# Patient Record
Sex: FEMALE | Race: WHITE
[De-identification: names, ages, dates, MRNs, and addresses within clinical notes are randomized per-mention and may not be internally consistent; named-entity substitution may affect disease eponyms.]

---

## 2018-04-27 NOTE — RAD
THREE VIEWS LUMBAR SPINE:

 

HISTORY: 

Radiculopathy.  Pain.

 

COMPARISON: 

None.

 

FINDINGS: 

There are 5 lumbar-type vertebral bodies.  Vertebral body height is maintained.  No fracture.  There 
is grade I retrolisthesis throughout the lumbar spine at L1-L2, L2-L3, and L3-L4.  There is moderate 
to severe loss of disk space height at L2-L3 and L3-L4.  Upon flexion, the degree of retrolisthesis d
oes not change.  Upon extension, the degree of retrolisthesis has not changed.

 

IMPRESSION: 

Persistent retrolisthesis as above.

 

POS: EPIFANIO

## 2018-04-27 NOTE — MRI
MRI LUMBAR SPINE WITHOUT IV CONTRAST:

 

Date:  04/27/18 

 

HISTORY:  

Lumbar radiculopathy. Patient states low back pain with pain radiating down base of neck to bilateral
 shoulders, as well as down into both hips and legs since June 2017. 

 

FINDINGS:

A 1.3 cm increased T2-weighted signal intensity lesion seen in the mid portion of right kidney, which
 likely represents cyst but is difficult to characterize on this exam. Nonemergent renal sonogram wou
ld be helpful for further evaluation. Retroperitoneal structures otherwise demonstrate a normal MRI a
ppearance. 

 

End plate degenerative changes are seen at the L3-4 level. Schmorl's node seen at the inferior end pl
ate of the L2 vertebral body. Normal signal intensity otherwise demonstrated in the bone marrow. 

 

T11-12 Level: 

There is a focus of increased T2-weighted signal intensity seen in the paracentral posterior margin o
f the intervertebral disc consistent with a small annular tear. There is minimal associated disc bulg
e with slight effacement of the ventral subarachnoid space. Neural foramina are patent. 

 

T12-L1 Level:

There is no disc bulge or disc herniation. There are mild facet degenerative changes. There is no sig
nificant narrowing of the central spinal canal and the neural foramina are widely patent. 

 

L1-2 Level:

There is loss of intervertebral disc height. There is trace retrolisthesis of L1 and L2. There is a b
road based disc osteophyte complex, but there is no significant narrowing of the central spinal canal
, and the neural foramina are patent. 

 

L2-3 Level:

There is trace retrolisthesis of L2 on L3. There is severe loss of intervertebral disc space height. 
There is a broad based disc osteophyte complex. This flattens the ventral aspect of the thecal sac wi
thout significant narrowing of the central spinal canal. There is mild encroachment on each neural fo
ramen.  

 

L3-4 Level:

There is trace retrolisthesis of L3 on L4. There is severe loss of intervertebral disc height. There 
is a broad based disc osteophyte complex present. This again results in slight flattening of the vent
ral aspect of the thecal sac without significant narrowing of the central spinal canal. There is mild
 to moderate left-sided neural foraminal narrowing with mild right-sided neural foraminal narrowing. 


 

L4-5 Level: 

There is minimal disc bulge without significant narrowing of the central spinal canal. Facet hypertro
phic changes are present at this level. There is mild to moderate right and mild left-sided neural fo
raminal narrowing. 

 

L5-S1 Level:

There is a minimal broad based disc bulge, but central spinal canal is patent. There are prominent fa
cet and hypertrophic changes bilaterally with mild bilateral neural foraminal narrowing. 

 

The conus medullaris is normal in appearance and terminates at the L1-2 level. 

 

IMPRESSION: 

1.  Multilevel degenerative changes with mild degrees of neural foraminal narrowing. 

 

2.  Trace retrolisthesis of L1 on L2, L2 on L3, and L3 on L4. 

 

3.  Annular tear involving the T11-12 intervertebral disc. 

 

4.  Cystic lesion right kidney, statistically likely representing a small renal cyst. However, renal 
sonogram would be helpful for further evaluation. 

 

 

 

POS: [facility]

## 2019-07-22 NOTE — ULT
LEFT LOWER EXTREMITY VENOUS DUPLEX ULTRASOUND INCLUDING COLOR AND SPECTRAL DOPPLER IMAGING:

 

HISTORY:

Lower leg edema and swelling.

 

TECHNIQUE:

Exam performed from groin to ankle, including the visualized greater saphenous, the common femoral, t
he superficial femoral, the profunda femoral, the popliteal, and trifurcation, and the posterior tibi
al vein regions.  There is phasic flow at all levels with normal compressibility and normal augmentat
ion.  No intraluminal thrombus.

 

FINDINGS:

In the medial mid calf region, there is a small fluid collection, measuring approximately 0.9 x 2.3 x
 9.9 cm, etiology is uncertain, conceivably this could represent a small hematoma or seroma.  Another
 consideration would be that of fluid, in association with a ruptured Baker's cyst or popliteal fossa
 cyst.  From ultrasound findings alone, the possibility of this representing a small infected fluid c
ollection cannot be totally excluded.  Please correlate clinically.  If additional imaging is felt to
 be needed clinically, a follow-up MRI with and without contrast might be of benefit.

 

IMPRESSION:

1.  No evidence for deep venous thrombosis.

 

2.  Elongated fluid collection in the soft tissues of the medial mid calf.

 

POS: RRE

## 2020-03-02 NOTE — MMO
Bilateral MAMMO Bilat Screen [facility].

 

CLINICAL HISTORY:

Patient is 69 years old and is seen for screening. The patient has no family

history of breast cancer.  The patient has no personal history of cancer.

 

VIEWS:

The views performed were:  bilateral craniocaudal with tomosynthesis and

bilateral mediolateral oblique with tomosynthesis.

 

FILMS COMPARED:

The present examination has been compared to prior imaging studies performed at

[facility] on 09/22/2015 and 12/12/2016.

 

This study has been interpreted with the assistance of computer-aided detection.

 

MAMMOGRAM FINDINGS:

There are scattered fibroglandular densities.

 

Benign calcifications are noted bilaterally.

 

There are no suspicious masses, suspicious calcifications, or new areas of

architectural distortion.

 

IMPRESSION:

THERE IS NO MAMMOGRAPHIC EVIDENCE OF MALIGNANCY.

 

A ROUTINE FOLLOW-UP MAMMOGRAM IN 1 YEAR IS RECOMMENDED.

 

THE RESULTS OF THIS EXAM WERE SENT TO THE PATIENT.

 

ACR BI-RADS Category 2 - Benign finding

 

MAMMOGRAPHY NOTE:

 1. A negative mammogram report should not delay a biopsy if a dominant of

 clinically suspicious mass is present.

 2. Approximately 10% to 15% of breast cancers are not detected by

 mammography.

 3. Adenosis and dense breasts may obscure an underlying neoplasm.

 

 

Reported by: JAYY ARAGON MD

Electonically Signed: [id number]

## 2020-06-16 NOTE — RAD
EXAM: 2 views of the right knee



HISTORY: Knee arthroplasty



COMPARISON: None



FINDINGS: No knee effusion is seen. The patient is status post knee arthroplasty without perihardware
 lucency or fracture. Air in the soft tissues is from recent surgery.



IMPRESSION: Status post knee arthroplasty without evidence of complication.



Reported By: Ravi Gonzales 

Electronically Signed:  6/16/2020 9:48 AM

## 2020-06-16 NOTE — OP
DATE OF PROCEDURE:  06/16/2020



This is Zacarias Pino PA-C dictating a report for Francisco Cannon MD.



PREOPERATIVE DIAGNOSIS:  End-stage tricompartmental osteoarthritis, right knee, with

degenerative genu valgum. 



POSTOPERATIVE DIAGNOSIS:  End-stage tricompartmental osteoarthritis, right knee,

with degenerative genu valgum. 



PROCEDURE PERFORMED:  Cemented cruciate-sparing computer-assisted navigated right

total knee arthroplasty. 



ASSISTANT:  Zacarias Pino PA-C



ANESTHESIA:  General via LMA, augmented with indwelling adductor canal and a

single-shot sciatic block. 



COMPONENTS USED:  [email or web address] Triathlon primary cemented cruciate-sparing

size 3 femoral component, primary cemented size 3 tibial base plate, 9-mm

polyethylene fixed bearing insert, and an S27 patellar button. 



TOURNIQUET TIME:  54 minutes at 300 mmHg.



FINDINGS:  End-stage severe degenerative tricompartmental disease, bone-on-bone

arthrosis, periarticular osteophyte formation, large serous effusion, hypertrophic

synovium, and changes consistent with degenerative genu valgum and a significant

wear pattern of the lateral compartment. 



DRAINS:  None.



SPECIMENS:  None.



COMPLICATIONS:  None.



ESTIMATED BLOOD LOSS:  Less than 100.



INDICATIONS FOR SURGERY:  Susanne is a 69-year-old female who has had progressive

right knee pain and problems with standing and walking for the last 5 to 7 years.

She has failed conservative management, elected to proceed with total knee

arthroplasty as definitive treatment of her pain. 



PROCEDURE IN DETAIL:  After informed consent was obtained in the preoperative

holding area, the patient was taken to the operative suite where general anesthesia

was induced.  Once adequate level of general anesthesia was obtained, the patient

was positioned and a well-padded tourniquet was placed around the right proximal

thigh.  The right lower extremity was then prepped and draped in the usual sterile

fashion.  Prior to exsanguination, a time-out was called and all members of the

surgical team agreed upon site, surgeon, and patient.  The extremity was then

exsanguinated and the tourniquet was raised.  A midline longitudinal incision was

then made directly over the patella extending 2 fingerbreadths above the superior

pole of the patella and 2 fingerbreadths inferior to the inferior patellar pole of

the patella.  Deeper subcutaneous layers were dissected sharply and local bleeding

was controlled with Bovie electrocautery.  A quad tendon longitudinal split was then

made sharply and a median parapatellar arthrotomy was carried out both sharp and

with Bovie electrocautery, carried down to 1 fingerbreadth medial to the tibial

tubercle.  The knee was then placed into flexion and the patella was everted nicely,

and a copious fat pad ectomy was performed, allowing for greater exposure of the

tibia.  The computer-assisted distal femoral fiducial was then placed and pinned

firmly, and the distal femoral cutting guide was pinned firmly into place.  The

oscillating saw was then used to remove the appropriate amount of bone.  The 4-in-1

cutting block was then placed on the distal femur and the oscillating saw was used

to remove the appropriate amount of bone off the anterior, posterior, and chamfer

cuts.  After completion of bone cuts, the anterior cruciate ligament was resected

sharply and the posterior cruciate ligament retractor was placed and the tibia was

subluxed for better exposure.  Partial meniscectomies were carried out, and the

tibial computer-assisted fiducial was pinned, and the cutting guide was placed.

Oscillating saw was then used to remove the bone, with Hohmann retractors used to

take care and protect the collateral ligaments.  After the tibial resection was

performed, a laminar  was placed in between the freshened bone cuts.  The

knee placed at 90 degrees and further bilateral meniscectomies were carried out, and

the curved osteotome and curettage were used to remove any excess bone spurs in the

posterior compartment.  The trial femoral component, tibial baseplate were placed

with the appropriate polyethylene trial insert with an appropriate polyethylene

spacer and patellar button.  The knee was taken through full range of motion with

flexion and extension from 0 to 90 degrees and patellar broach squarely in the

trochlea without any squinting or subluxation noted.  The knee was also stable to

varus and valgus stressing at 0, 15, 45, and 90 degrees of flexion.  The drawer was

negative.  All trial components were then removed and the keel punch was used to

provide the appropriate defect in the tibia with a mallet.  The freshened bone cuts

were copiously irrigated with pulsatile lavage of about 1.5 L to remove all excess

debris.  The freshened bone cuts were then dried with suction and lap sponge.  The

knee was placed in flexion and retractors were placed to provide access to all bone

cuts.  Tobramycin-impregnated methyl methacrylate cement was then placed on the

freshened bone cuts and implants which were malleted firmly into place.  Curettage

and [place] elevators were used to remove any excess bone cement.  The knee was placed

into full extension and the patellar button was placed under compression, and the

cement was allowed to cure.  Once completed, the components were again taken through

full range of motion and copious irrigation of the knee was carried out with another

liter of normal saline.  All components were inspected fully with full range of

motion and varus and valgus stressing. There was no laxity noted and full extension

was observed clinically.  Primary closure was accomplished with #2 interrupted

Vicryl stitch of the arthrotomy defect.  This was oversewn with a #2 running Quill

barbed stitch.  The subcutaneous layer was then closed with a running 0 barbed

Monocryl stitch and skin closure accomplished with a running subcuticular 3-0

Monocryl barbed Quill stitch and augmented with cement on the skin.  Tourniquet was

lowered.  Good spontaneous return of distal pulses was noted clinically and a

sterile dressing was applied to the incision.  The procedure was terminated without

any complications.  The patient was awakened in the operative suite and taken to the

recovery room in stable condition. 







Job ID:  [id number]

## 2020-06-17 NOTE — PDOC.HOSPP
- Subjective


Encounter Date: 06/17/20


Subjective: 


69 years old female with no significant past medical history apart from 

arthritis and anxiety. She is status post TKA POD1.


She is participating in physical therapy and her pain is controlled.


BP is on the lower side.





- Objective


Vital Signs & Weight: 


 Vital Signs (12 hours)











  Temp Pulse Resp BP Pulse Ox


 


 06/17/20 11:08  97.5 F L  91  18  80/53 L  96


 


 06/17/20 07:21  98.0 F  74  18  95/62  100


 


 06/17/20 05:34  98 F   16  101/66  93 L








 Weight











Admit Weight                   171 lb


 


Weight                         171 lb














I&O: 


 











 06/16/20 06/17/20 06/18/20





 06:59 06:59 06:59


 


Intake Total  874 


 


Output Total  3000 


 


Balance  -2126 











Result Diagrams: 


 06/17/20 05:06








Hospitalist ROS





- Medication


Medications: 


Active Medications











Generic Name Dose Route Start Last Admin





  Trade Name Freq  PRN Reason Stop Dose Admin


 


Hydrocodone Bitart/Acetaminophen  1 tab  06/16/20 07:24  06/17/20 08:52





  [place] 10/325  PO   1 tab





  Q4H PRN   Administration





  Pain (1-3)   





     





     





     


 


Alprazolam  1 mg  06/16/20 15:00  06/17/20 08:51





  Xanax  PO   1 mg





  TID MINNIE   Administration





     





     





     





     


 


Aspirin  81 mg  06/16/20 09:00  06/17/20 08:50





  Ecotrin  PO   81 mg





  BID MINNIE   Administration





     





     





     





     


 


Enoxaparin Sodium  40 mg  06/17/20 09:00  06/17/20 08:52





  Lovenox  SC   40 mg





  0900 MINNIE   Administration





     





     





     





     


 


Ferrous Gluconate  324 mg  06/17/20 08:00  06/17/20 08:50





  Fergon  PO   324 mg





  BID- MINNIE   Administration





     





     





     





     


 


Sodium Chloride  1,000 mls @ 100 mls/hr  06/16/20 07:00  06/17/20 13:00





  Normal Saline 0.9%  IV   Not Given





  .Q10H MINNIE   





     





     





     





     


 


Ropivacaine 250 ml/ Device  250 mls @ 10 mls/hr  06/16/20 07:24  06/17/20 09:49





  NERVE BLCK  06/19/20 07:23  250 mls





  INF MINNIE   Administration





     





     





     





     


 


Iron/Minerals/Multivitamins  1 tab  06/17/20 09:00  06/17/20 08:51





  Theragran M  PO   1 tab





  DAILY MINNIE   Administration





     





     





     





     


 


Ketorolac Tromethamine  15 mg  06/16/20 16:00  06/17/20 09:49





  Toradol  IVP  06/18/20 10:01  15 mg





  0400,1000,1600,2200 MINNIE   Administration





     





     





     





     


 


Pantoprazole Sodium  40 mg  06/17/20 09:00  06/17/20 08:50





  Protonix  PO   40 mg





  DAILY MINNIE   Administration





     





     





     





     


 


Quetiapine Fumarate  100 mg  06/16/20 21:00  06/16/20 21:12





  Seroquel  PO   100 mg





  HS MINNIE   Administration





     





     





     





     


 


Senna/Docusate Sodium  2 tab  06/17/20 09:00  06/17/20 08:50





  Senokot S  PO   2 tab





  BID MINNIE   Administration





     





     





     





     














- Exam


General Appearance: awake alert


ENT: normocephalic atraumatic


Neck: supple


Respiratory: normal chest expansion, no tachypnea


Gastrointestinal: soft


Neurological: cranial nerve grossly intact





Hosp A/P


(1) Hypotension


Status: Acute   





(2) Anxiety


Code(s): F41.9 - ANXIETY DISORDER, UNSPECIFIED   Status: Acute   





(3) Osteoarthritis


Code(s): M19.90 - UNSPECIFIED OSTEOARTHRITIS, UNSPECIFIED SITE   Status: Acute 

  





- Plan


Bolus 500cc of NS.


Enoxaparin for DVTppx.


Protonix for ulcer ppx since the patient is receiving NSAID.


PT & OT.

## 2020-10-15 NOTE — ULT
EXAM:

Right lower extremity venous Doppler US



HISTORY:

Right lower extremity edema and pain



FINDINGS:

Grayscale, color-flow, Doppler evaluation, spectral analysis of the right lower extremity venous stru
ctures is performed with 2-D imaging. The right common femoral, superficial femoral, popliteal,

posterior tibial, proximal greater saphenous and profunda femoral veins are imaged.



There is normal luminal compressibility, flow, and augmentation the visualized deep venous structures
 of the right lower extremity.



IMPRESSION:

No evidence of a deep vein thrombosis in the right lower extremity.



Reported By: Jaycob Cao 

Electronically Signed:  10/15/2020 11:57 AM

## 2020-11-17 NOTE — RAD
LUMBAR SPINE 4 VIEWS:

 

Date:  11/17/2020

 

INDICATION:

History of intervertebral disc disorder with lumbar radiculopathy. 

 

COMPARISON:  

MRI of the lumbar spine performed on 11/17/2020. 

 

FINDINGS:

As seen on the comparison lumbar spine MRI is retrolisthesis of L3 on L4 and L2 on L3 with mild Grade
 I anterolisthesis of L5 on S1. There is also very minimal retrolisthesis of L1 on L2. No definite ab
normal translational motion is seen on the flexion/extension radiographs. SI joints are normal appear
ing. 

 

IMPRESSION: 

Spondylosis of the lumbar spine without abnormal translation motion. 

 

 

POS: BH

## 2020-11-17 NOTE — MRI
MRI LUMBAR SPINE WITHOUT CONTRAST:

 

Date:  11/17/2020

 

INDICATION:

70-year-old female with intervertebral disc disorder and radiculopathy. 

 

COMPARISON:  

Prior exam dated 04/27/2018. 

 

FINDINGS:

There is persistent retrolisthesis that is mild in severity affecting L3-4 and L2-3. There is advance
d disc degenerative disease at L2-3 and L3-4 which is similar. No acute fracture is evident. The conu
s is seen to terminate at L1. Visualized aspects of the retroperitoneum appear within normal limits. 


 

At L5-S1, there is moderate facet joint degenerative change and a mild broad based disc bulge. There 
is very subtle Grade I anterolisthesis that is stable. There is mild bilateral neural foraminal narro
wing which is stable. 

 

At L4-5, there is a broad based bulge with moderate left and mild right facet joint degenerative poon
ge that appears stable in size. There is mild bilateral neural foraminal narrowing that is stable. 

 

At L3-4, there is a disc osteophyte complex with facet hypertrophy and loss of disc space height domenic
cing mild bilateral neural foraminal narrowing, left greater than right, that is stable. 

 

At L2-3, there is a broad based asymmetric to the right disc osteophyte complex inducing mild right n
eural foraminal narrowing which is stable. 

 

At L1-2, there is a broad based bulge inducing some mild effacement of the subarachnoid space without
 nerve root compression. There is mild bilateral neural foraminal narrowing which is stable. 

 

At T12-L1, there is no appreciable central canal or neural foraminal narrowing. 

 

IMPRESSION: 

Stable moderate spondylosis of the lumbar spine with multilevel neural foraminal narrowing. 

 

 

POS: LJ

## 2020-12-18 NOTE — ULT
Left lower extremity venous Doppler ultrasound:

12/18/2020



COMPARISON: None



HISTORY: Left knee replacement, posterior left knee pain



TECHNIQUE: Multiplanar grayscale sonographic imaging of the venous structures of the left lower extre
mity obtained with color flow and spectral analysis



FINDINGS: Left common femoral vein, greater saphenous vein, profunda femoral vein, femoral vein, popl
iteal vein, and posterior tibial vein are patent. Normal blood flow, augmentation, and compression

within the deep venous system on the left. No evidence for deep venous thrombosis.



There is a large lobulated fluid collection in the posterior medial aspect of the popliteal fossa wit
h internal debris suggesting a complex popliteal fossa cyst, measuring at least 4.8 x 2.9 x 3.2 cm.



IMPRESSION: No evidence for deep venous thrombosis of the left lower extremity. Findings suggesting a
 large complex left popliteal fossa cyst.



Reported By: Mo Montoya 

Electronically Signed:  12/18/2020 12:55 PM

## 2021-03-02 ENCOUNTER — HOSPITAL ENCOUNTER (OUTPATIENT)
Dept: HOSPITAL 92 - BICMAMMO | Age: 71
Discharge: HOME | End: 2021-03-02
Attending: FAMILY MEDICINE
Payer: MEDICARE

## 2021-03-02 DIAGNOSIS — Z12.31: Primary | ICD-10-CM

## 2021-03-02 PROCEDURE — 77067 SCR MAMMO BI INCL CAD: CPT

## 2021-03-02 PROCEDURE — 77063 BREAST TOMOSYNTHESIS BI: CPT

## 2021-03-02 NOTE — MMO
Bilateral MAMMO Bilat Screen [facility].

 

CLINICAL HISTORY:

Patient is 70 years old and is seen for screening. The patient has no family

history of breast cancer.  The patient has no personal history of cancer.

 

VIEWS:

The views performed were:  bilateral craniocaudal with tomosynthesis and

bilateral mediolateral oblique with tomosynthesis.

 

FILMS COMPARED:

The present examination has been compared to prior imaging studies performed at

[facility] on 02/21/2020, and at [facility] on 09/22/2015 and 12/12/2016.

 

This study has been interpreted with the assistance of computer-aided detection.

 

MAMMOGRAM FINDINGS:

There are scattered fibroglandular densities.

 

There are stable benign appearing calcifications seen in both breasts.

 

There are no suspicious masses, suspicious calcifications, or new areas of

architectural distortion.

 

IMPRESSION:

THERE IS NO MAMMOGRAPHIC EVIDENCE OF MALIGNANCY.

 

A ROUTINE FOLLOW-UP MAMMOGRAM IN 1 YEAR IS RECOMMENDED.

 

THE RESULTS OF THIS EXAM WERE SENT TO THE PATIENT.

 

ACR BI-RADS Category 2 - Benign finding

 

MAMMOGRAPHY NOTE:

 1. A negative mammogram report should not delay a biopsy if a dominant of

 clinically suspicious mass is present.

 2. Approximately 10% to 15% of breast cancers are not detected by

 mammography.

 3. Adenosis and dense breasts may obscure an underlying neoplasm.

 

 

Reported by: ADALGISA HERNANDEZ MD

Electonically Signed: [id number]